# Patient Record
Sex: FEMALE | Race: WHITE | URBAN - METROPOLITAN AREA
[De-identification: names, ages, dates, MRNs, and addresses within clinical notes are randomized per-mention and may not be internally consistent; named-entity substitution may affect disease eponyms.]

---

## 2017-04-21 ENCOUNTER — OFFICE VISIT (OUTPATIENT)
Dept: FAMILY MEDICINE | Facility: CLINIC | Age: 52
End: 2017-04-21

## 2017-04-21 VITALS
WEIGHT: 130 LBS | HEART RATE: 81 BPM | DIASTOLIC BLOOD PRESSURE: 85 MMHG | SYSTOLIC BLOOD PRESSURE: 128 MMHG | TEMPERATURE: 98.7 F

## 2017-04-21 DIAGNOSIS — M54.50 CHRONIC BILATERAL LOW BACK PAIN WITHOUT SCIATICA: Primary | ICD-10-CM

## 2017-04-21 DIAGNOSIS — G89.29 CHRONIC BILATERAL LOW BACK PAIN WITHOUT SCIATICA: Primary | ICD-10-CM

## 2017-04-21 DIAGNOSIS — R41.840 ATTENTION AND CONCENTRATION DEFICIT: ICD-10-CM

## 2017-04-21 RX ORDER — ACETAMINOPHEN 325 MG/1
650 TABLET ORAL EVERY 6 HOURS PRN
Qty: 30 TABLET | Refills: 1 | Status: SHIPPED | OUTPATIENT
Start: 2017-04-21

## 2017-04-21 NOTE — PATIENT INSTRUCTIONS
If you have thoughts about self harm increase or if you just need additional support and care, here are some resources for you:    Crisis Lines:    [facility] Adult Crisis:  [phone]  Crisis Connection:  [phone]  [facility] Multilingual Crisis Line:  [phone]    You can also consider going to the [facility]gent [facility] at the following address.  Walk ins are welcome:    [address] [place] [address]   [phone] (for 24 hour crisis consultation)    Monday - Friday 8:00am - 7:00pm  Saturday:  11:00am - 3:00pm  Sunday and Holidays Closed    If you feel at risk of immediate harm, go directly to the Emergency Department.    [facility]:  Address: [address] [place] [address]  Phone: [phone]    Return for a visit when you are ok with us looking at your right foot.    Call with any questions or concerns.    Mental Health Referral  Please see documentation encounter 4/25/2017   Kat Cruz 10:59 AM 4/25/2017  Mailed letter to patient with a list of Psychology clinics recommended by   Dr. Borrego.  Deena Abraham  5/4/17    Referral Tracking:  Letter mailed to patient. Checking to see if patient has contacted Psychology clinics recommended by Dr. Borrego.  Deena Abraham  7/18/17
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
## 2017-04-21 NOTE — PROGRESS NOTES
Preceptor attestation:  Vital signs reviewed: /85  Pulse 81  Temp 98.7  F (37.1  C) (Oral)  Wt 130 lb (59 kg)    Patient seen and discussed with the resident. Assessment and plan reviewed with resident and agreed upon.    Supervising physician: Lubna Calvin MD  [facility]

## 2017-04-21 NOTE — LETTER
.July 18, 2017      Mary Beth Cross  [address]  [facility] [address]  [place] [address]        Dear Mary Beth,    At your appointment a few weeks ago Dr Paulson referred you to Psychologist for Adult ADHD evaluation.    I am writing to follow up with this referral to see if you have been able schedule an appointment with a psychologists.  I wasn't able to reach you by phone.    If you need more resources, information or have barriers that you'd like help with, please call us.    Thank you,    Deena  Referral Coordinator  [phone]

## 2017-04-21 NOTE — MR AVS SNAPSHOT
After Visit Summary   4/21/2017    Mary Beth Cross    MRN: [id number]           Patient Information     Date Of Birth          1965        Visit Information        Provider Department      4/21/2017 2:10 PM Gracy Paulson DO [facility]        Today's Diagnoses     Chronic bilateral low back pain without sciatica    -  1      Care Instructions    If you have thoughts about self harm increase or if you just need additional support and care, here are some resources for you:    Crisis Lines:    Psychiatric Adult Crisis:  [phone]  Crisis Connection:  [phone]  [facility] Multilingual Crisis Line:  [phone]    You can also consider going to the Urgent Care Center for Adult Mental Health at the following address.  Walk ins are welcome:    [address] [place] [address]   [phone] (for 24 hour crisis consultation)    Monday - Friday 8:00am - 7:00pm  Saturday:  11:00am - 3:00pm  Sunday and Holidays Closed    If you feel at risk of immediate harm, go directly to the Emergency Department.    [facility]:  Address: [address] [place] [address]  Phone: [phone]    Return for a visit when you are ok with us looking at your right foot.    Call with any questions or concerns.        Follow-ups after your visit        Who to contact     Please call your clinic at [phone] to:    Ask questions about your health    Make or cancel appointments    Discuss your medicines    Learn about your test results    Speak to your doctor   If you have compliments or concerns about an experience at your clinic, or if you wish to file a complaint, please contact [facility] Patient Relations at [phone] or email us at [email or web address]         Additional Information About Your Visit        MyChart Information     [employer] is an electronic gateway that provides easy, online access to your medical records. With [employer],  you can request a clinic appointment, read your test results, renew a prescription or communicate with your care team.     To sign up for [employer] visit the website at [email or web address]   You will be asked to enter the access code listed below, as well as some personal information. Please follow the directions to create your username and password.     Your access code is: [email or web address]  Expires: 2017  2:20 PM     Your access code will  in 90 days. If you need help or a new code, please contact your [facility] or call [phone] for assistance.        Care EveryWhere ID     This is your Care EveryWhere ID. This could be used by other organizations to access your [place] medical records  [id number]        Your Vitals Were     Pulse Temperature                81 98.7  F (37.1  C) (Oral)           Blood Pressure from Last 3 Encounters:   17 128/85    Weight from Last 3 Encounters:   17 130 lb (59 kg)              Today, you had the following     No orders found for display         Today's Medication Changes          These changes are accurate as of: 17  2:20 PM.  If you have any questions, ask your nurse or doctor.               Start taking these medicines.        Dose/Directions    acetaminophen 325 MG tablet   Commonly known as:  TYLENOL   Used for:  Chronic bilateral low back pain without sciatica   Started by:  Gracy Paulson DO        Dose:  650 mg   Take 2 tablets (650 mg) by mouth every 6 hours as needed for mild pain   Quantity:  30 tablet   Refills:  1            Where to get your medicines      [facility] [place] [place] - [address], [place] [address]     Phone:  [phone]     acetaminophen 325 MG tablet                Primary Care Provider Office Phone # Fax #    Gracy Paulson [phone]       [place] [address]  [facility] [address]        Thank you!     Thank you for choosing [facility]  for your care. Our goal is always to provide you with excellent care. Hearing back from our patients is one way we can continue to improve our services. Please take a few minutes to complete the written survey that you may receive in the mail after your visit with us. Thank you!             Your Updated Medication List - Protect others around you: Learn how to safely use, store and throw away your medicines at www.disposemymeds.org.          This list is accurate as of: 4/21/17  2:20 PM.  Always use your most recent med list.                   Brand Name Dispense Instructions for use    acetaminophen 325 MG tablet    TYLENOL    30 tablet    Take 2 tablets (650 mg) by mouth every 6 hours as needed for mild pain

## 2017-04-25 ENCOUNTER — DOCUMENTATION ONLY (OUTPATIENT)
Dept: FAMILY MEDICINE | Facility: CLINIC | Age: 52
End: 2017-04-25

## 2017-04-27 NOTE — PROGRESS NOTES
This is a referral for adult ADHD evaluation. Please provide patient with the following resources for scheduling testing for ADHD:    [facility]  [address] [facility] [address]  [place]  [address]  [phone]    [facility]  [address] [place] [address] [address]  [place]  [address]  [phone]    [facility] Location  [address] [place], [place]  [phone]

## 2017-07-08 ENCOUNTER — HEALTH MAINTENANCE LETTER (OUTPATIENT)
Age: 52
End: 2017-07-08

## 2021-05-27 ENCOUNTER — RECORDS - HEALTHEAST (OUTPATIENT)
Dept: ADMINISTRATIVE | Facility: CLINIC | Age: 56
End: 2021-05-27

## 2021-05-31 ENCOUNTER — RECORDS - HEALTHEAST (OUTPATIENT)
Dept: ADMINISTRATIVE | Facility: CLINIC | Age: 56
End: 2021-05-31